# Patient Record
Sex: MALE | Race: WHITE | NOT HISPANIC OR LATINO | Employment: OTHER | URBAN - METROPOLITAN AREA
[De-identification: names, ages, dates, MRNs, and addresses within clinical notes are randomized per-mention and may not be internally consistent; named-entity substitution may affect disease eponyms.]

---

## 2017-01-17 ENCOUNTER — CLINICAL SUPPORT (OUTPATIENT)
Dept: SMOKING CESSATION | Facility: CLINIC | Age: 67
End: 2017-01-17
Payer: COMMERCIAL

## 2017-01-17 DIAGNOSIS — F17.210 MODERATE SMOKER (20 OR LESS PER DAY): Primary | ICD-10-CM

## 2017-01-17 PROCEDURE — 99404 PREV MED CNSL INDIV APPRX 60: CPT | Mod: S$GLB,,,

## 2017-01-17 RX ORDER — IBUPROFEN 200 MG
1 TABLET ORAL DAILY
Qty: 28 PATCH | Refills: 0 | Status: SHIPPED | OUTPATIENT
Start: 2017-01-17 | End: 2017-02-07 | Stop reason: SDUPTHER

## 2017-01-24 ENCOUNTER — CLINICAL SUPPORT (OUTPATIENT)
Dept: SMOKING CESSATION | Facility: CLINIC | Age: 67
End: 2017-01-24
Payer: COMMERCIAL

## 2017-01-24 DIAGNOSIS — F17.210 MODERATE SMOKER (20 OR LESS PER DAY): Primary | ICD-10-CM

## 2017-01-24 PROCEDURE — 90853 GROUP PSYCHOTHERAPY: CPT | Mod: S$GLB,,,

## 2017-01-24 NOTE — PROGRESS NOTES
Smoking Cessation Group Session #2    Site: [facility]  Date:  1/24/2017  Clinical Status of Patient: Outpatient   Length of Service and Code: 90 minutes - 71026   Number in Attendance: 12  Group Activities/Focus of Group: completion of TCRS (Tobacco Cessation Rating Scale) reviewed strategies, cues, and triggers. Introduced the negative impact of tobacco on health, the health advantages of discontinuing the use of tobacco, time line improved health changes after a quit, withdrawal issues to expect from nicotine and habit, and ways to achieve the goal of a quit.    Target symptoms:  withdrawal and medication side effects             The following were rated moderate (3) to severe (4) on TCRS:       Moderate 3: desire/crave tobacco, depressed mood, insomnia, restless, urinate more often     Severe 4:   Angry/irritable/frustrated, anxious/nervous, agitated/worked up  Patient's Response to Intervention: 22ppm; 121-15 cig/day; Patient remains on prescribed tobacco cessation medication regimen of 21 mg patch, without any negative side effects at this time.    Progress Toward Goals and Other Mental Status Changes: The patient denies any abnormal behavioral or mental changes at this time.     Diagnosis: Z72.0  Plan: The patient will continue with group therapy sessions and medication monitoring by CTTS. Prescribed medication management will be by physician.   Return to Clinic: 1 week    Quit Date:    Planned Quit Date:

## 2017-01-24 NOTE — Clinical Note
22ppm; 121-15 cig/day; Patient remains on prescribed tobacco cessation medication regimen of 21 mg patch, without any negative side effects at this time.

## 2017-01-31 ENCOUNTER — CLINICAL SUPPORT (OUTPATIENT)
Dept: SMOKING CESSATION | Facility: CLINIC | Age: 67
End: 2017-01-31
Payer: COMMERCIAL

## 2017-01-31 DIAGNOSIS — F17.200 NICOTINE DEPENDENCE: Primary | ICD-10-CM

## 2017-01-31 PROCEDURE — 90853 GROUP PSYCHOTHERAPY: CPT | Mod: S$GLB,,,

## 2017-01-31 NOTE — PROGRESS NOTES
Smoking Cessation Group Session #3    Site: [facility]  Date:  1/31/2017  Clinical Status of Patient: Outpatient   Length of Service and Code: 90 minutes - 77473   Number in Attendance: 11  Group Activities/Focus of Group:  completion of TCRS (Tobacco Cessation Rating Scale) reviewed strategies, controlling environment, cues, triggers, new goals set. Introduced high risk situations with preparation interventions, caffeine similarities with withdrawal issues of habit and nicotine, Alcohol, Understanding urges, cravings, stress and relaxation. Open discussion with intervention discussion.  Target symptoms:  withdrawal and medication side effects             The following were rated moderate (3) to severe (4) on TCRS:       Moderate 3: depressed mood, sad, difficulty concentrating, anxious, nervous, insomnia, agitated or worked up; reviewed with patient     Severe 4:   Angry, irritable, frustrated, restless; reviewed with patient  Patient's Response to Intervention:  Patient is down to smoking about 1ppd. Patient remains on prescribed tobacco cessation medication regimen of 21 mg patch, without any negative side effects at this time. Discussed rate reduction with him and encouraged him to wait 15 min prior to smoking, move location of where he keeps his cigarettes, and to stay distracted and develop new habits oppose to smoking. Pt's goal for the week is to cut back to remain tobacco free, and continue to wear the patch. CO was 17 ppm with last cigarette smoked 1 hr prior.. Will see pt back in 1 week for group session.    Progress Toward Goals and Other Mental Status Changes: The patient denies any abnormal behavioral or mental changes at this time.       Diagnosis: Z72.0  Plan: The patient will continue with group therapy sessions and medication regimen prescribed with management by physician or by the Cessation Clinic Provider. Patient will inform Smoking Cessation Counselor of symptoms as rated high on TCRS.    Return to  Clinic: 1 week

## 2017-01-31 NOTE — Clinical Note
Patient is down to smoking about 1ppd. Patient remains on prescribed tobacco cessation medication regimen of 21 mg patch, without any negative side effects at this time. Discussed rate reduction with him and encouraged him to wait 15 min prior to smoking, move location of where he keeps his cigarettes, and to stay distracted and develop new habits oppose to smoking. Pt's goal for the week is to cut back to remain tobacco free, and continue to wear the patch. CO was 17 ppm with last cigarette smoked 1 hr prior.. Will see pt back in 1 week for group session.

## 2017-02-07 ENCOUNTER — CLINICAL SUPPORT (OUTPATIENT)
Dept: SMOKING CESSATION | Facility: CLINIC | Age: 67
End: 2017-02-07
Payer: COMMERCIAL

## 2017-02-07 DIAGNOSIS — F17.210 MODERATE SMOKER (20 OR LESS PER DAY): Primary | ICD-10-CM

## 2017-02-07 PROCEDURE — 90853 GROUP PSYCHOTHERAPY: CPT | Mod: S$GLB,,,

## 2017-02-07 RX ORDER — IBUPROFEN 200 MG
1 TABLET ORAL DAILY
Qty: 14 PATCH | Refills: 0 | Status: SHIPPED | OUTPATIENT
Start: 2017-02-07 | End: 2017-02-09 | Stop reason: SDUPTHER

## 2017-02-07 NOTE — Clinical Note
13ppm; 15 cig/day; Patient remains on prescribed tobacco cessation medication regimen of 21 mg patch, without any negative side effects at this time.

## 2017-02-07 NOTE — PROGRESS NOTES
Smoking Cessation Group Session #4    Site: [facility]  Date:  2/7/2017  Clinical Status of Patient: Outpatient   Length of Service and Code: 90 minutes - 76353   Number in Attendance: 11  Group Activities/Focus of Group:  completion of TCRS (Tobacco Cessation Rating Scale) reviewed strategies, habitual behavior, stress, and high risk situations. Introduced stress with addition interventions, SOLVE, relaxation with interventions, nutrition, exercise, weight gain, and the importance of rewarding oneself for accomplishments toward becoming tobacco free. Open discussion of all items with interventions.     Target symptoms:  withdrawal and medication side effects             The following were rated moderate (3) to severe (4) on TCRS:       Moderate 3: none     Severe 4:   none  Patient's Response to Intervention: 13ppm; 15 cig/day; Patient remains on prescribed tobacco cessation medication regimen of 21 mg patch, without any negative side effects at this time.    Progress Toward Goals and Other Mental Status Changes: The patient denies any abnormal behavioral or mental changes at this time.     Diagnosis: Z72.0  Plan:The patient will continue with group therapy sessions and medication monitoring by CTTS. Prescribed medication management will be by physician.   Return to Clinic: 1 week    Quit Date:    Planned Quit Date:

## 2017-02-21 ENCOUNTER — CLINICAL SUPPORT (OUTPATIENT)
Dept: SMOKING CESSATION | Facility: CLINIC | Age: 67
End: 2017-02-21
Payer: COMMERCIAL

## 2017-02-21 DIAGNOSIS — F17.210 LIGHT CIGARETTE SMOKER (1-9 CIGS/DAY): Primary | ICD-10-CM

## 2017-02-21 PROCEDURE — 90853 GROUP PSYCHOTHERAPY: CPT | Mod: S$GLB,,,

## 2017-02-21 RX ORDER — IBUPROFEN 200 MG
1 TABLET ORAL DAILY
Qty: 14 PATCH | Refills: 0 | Status: SHIPPED | OUTPATIENT
Start: 2017-02-21 | End: 2018-04-12

## 2017-02-21 NOTE — Clinical Note
14ppm; 5 cig/day; The patient remains on the prescribed tobacco cessation medication regimen of 14 mg patch without any negative side effects at this time.

## 2017-02-21 NOTE — PROGRESS NOTES
Smoking Cessation Group Session #6    Site: [facility]  Date:  2/21/2017  Clinical Status of Patient: Outpatient   Length of Service and Code: 90 minutes - [id number]   Number in Attendance: 10  Group Activities/Focus of Group:  completion of TCRS (Tobacco Cessation Rating Scale) reviewed strategies, cues, triggers, high risk situations, lapses, relapses, diet, exercise, stress, relaxation, sleep, habitual behavior, and life style changes.    Target symptoms:  withdrawal and medication side effects             The following were rated moderate (3) to severe (4) on TCRS:       Moderate 3: none     Severe 4:   none  Patient's Response to Intervention: 14ppm; 5 cig/day; The patient remains on the prescribed tobacco cessation medication regimen of 14 mg patch without any negative side effects at this time.     Progress Toward Goals and Other Mental Status Changes: The patient denies any abnormal behavioral or mental changes at this time.     Diagnosis: Z72.0  Plan:The patient will continue with group therapy sessions and medication monitoring by CTTS. Prescribed medication management will be by physician.   Return to Clinic: 2 weeks    Quit Date:    Planned Quit Date:

## 2017-03-07 ENCOUNTER — CLINICAL SUPPORT (OUTPATIENT)
Dept: SMOKING CESSATION | Facility: CLINIC | Age: 67
End: 2017-03-07
Payer: COMMERCIAL

## 2017-03-07 DIAGNOSIS — F17.210 LIGHT CIGARETTE SMOKER (1-9 CIGS/DAY): Primary | ICD-10-CM

## 2017-03-07 PROCEDURE — 99401 PREV MED CNSL INDIV APPRX 15: CPT | Mod: S$GLB,,,

## 2017-03-07 NOTE — PROGRESS NOTES
Individual Follow-Up Form    3/7/2017    Quit Date:     Clinical Status of Patient: Outpatient    Length of Service: 15 minutes    Continuing Medication: yes  Patches    Other Medications:      Target Symptoms: Withdrawal and medication side effects. The following were  rated moderate (3) to severe (4) on TCRS:  · Moderate (3): none  · Severe (4): none    Comments: 16ppm; 4 cig/day; The patient remains on the prescribed tobacco cessation medication regimen of 14 mg patch without any negative side effects at this time.       Diagnosis: F17.210    Next Visit: 2 weeks

## 2017-03-07 NOTE — Clinical Note
16ppm; 4 cig/day; The patient remains on the prescribed tobacco cessation medication regimen of 14 mg patch without any negative side effects at this time.

## 2017-05-24 ENCOUNTER — CLINICAL SUPPORT (OUTPATIENT)
Dept: SMOKING CESSATION | Facility: CLINIC | Age: 67
End: 2017-05-24
Payer: COMMERCIAL

## 2017-05-24 DIAGNOSIS — F17.200 NICOTINE DEPENDENCE: Primary | ICD-10-CM

## 2017-05-24 PROCEDURE — 99407 BEHAV CHNG SMOKING > 10 MIN: CPT | Mod: S$GLB,,,

## 2018-01-16 ENCOUNTER — CLINICAL SUPPORT (OUTPATIENT)
Dept: SMOKING CESSATION | Facility: CLINIC | Age: 68
End: 2018-01-16
Payer: COMMERCIAL

## 2018-01-16 ENCOUNTER — TELEPHONE (OUTPATIENT)
Dept: SMOKING CESSATION | Facility: CLINIC | Age: 68
End: 2018-01-16

## 2018-01-16 DIAGNOSIS — F17.200 NICOTINE DEPENDENCE: Primary | ICD-10-CM

## 2018-01-16 PROCEDURE — 99406 BEHAV CHNG SMOKING 3-10 MIN: CPT | Mod: S$GLB,,,

## 2018-04-12 PROBLEM — E66.3 OVERWEIGHT (BMI 25.0-29.9): Status: ACTIVE | Noted: 2018-04-12

## 2019-07-26 ENCOUNTER — PATIENT OUTREACH (OUTPATIENT)
Dept: ADMINISTRATIVE | Facility: HOSPITAL | Age: 69
End: 2019-07-26

## 2019-07-26 ENCOUNTER — TELEPHONE (OUTPATIENT)
Dept: ADMINISTRATIVE | Facility: HOSPITAL | Age: 69
End: 2019-07-26

## 2020-08-17 PROBLEM — Z91.148 NON COMPLIANCE W MEDICATION REGIMEN: Status: ACTIVE | Noted: 2020-08-17

## 2021-02-17 PROBLEM — D12.6 TUBULAR ADENOMA OF COLON: Status: ACTIVE | Noted: 2021-02-17

## 2021-02-17 PROBLEM — Z91.148 NON COMPLIANCE W MEDICATION REGIMEN: Status: RESOLVED | Noted: 2020-08-17 | Resolved: 2021-02-17

## 2021-03-05 ENCOUNTER — IMMUNIZATION (OUTPATIENT)
Dept: FAMILY MEDICINE | Facility: CLINIC | Age: 71
End: 2021-03-05
Payer: MEDICARE

## 2021-03-05 DIAGNOSIS — Z23 NEED FOR VACCINATION: Primary | ICD-10-CM

## 2021-03-05 PROCEDURE — 91300 COVID-19, MRNA, LNP-S, PF, 30 MCG/0.3 ML DOSE VACCINE: CPT | Mod: PBBFAC | Performed by: FAMILY MEDICINE

## 2021-03-26 ENCOUNTER — IMMUNIZATION (OUTPATIENT)
Dept: FAMILY MEDICINE | Facility: CLINIC | Age: 71
End: 2021-03-26
Payer: MEDICARE

## 2021-03-26 DIAGNOSIS — Z23 NEED FOR VACCINATION: Primary | ICD-10-CM

## 2021-03-26 PROCEDURE — 91300 COVID-19, MRNA, LNP-S, PF, 30 MCG/0.3 ML DOSE VACCINE: CPT | Mod: PBBFAC | Performed by: FAMILY MEDICINE

## 2021-03-26 PROCEDURE — 0002A COVID-19, MRNA, LNP-S, PF, 30 MCG/0.3 ML DOSE VACCINE: CPT | Mod: PBBFAC | Performed by: FAMILY MEDICINE

## 2021-07-01 ENCOUNTER — HOSPITAL ENCOUNTER (EMERGENCY)
Facility: HOSPITAL | Age: 71
Discharge: HOME | End: 2021-07-01
Attending: EMERGENCY MEDICINE
Payer: MEDICARE

## 2021-07-01 ENCOUNTER — PATIENT MESSAGE (OUTPATIENT)
Dept: ADMINISTRATIVE | Facility: OTHER | Age: 71
End: 2021-07-01

## 2021-07-01 VITALS
HEART RATE: 60 BPM | BODY MASS INDEX: 26.12 KG/M2 | SYSTOLIC BLOOD PRESSURE: 122 MMHG | WEIGHT: 192.56 LBS | OXYGEN SATURATION: 100 % | DIASTOLIC BLOOD PRESSURE: 79 MMHG | RESPIRATION RATE: 18 BRPM | TEMPERATURE: 97 F

## 2021-07-01 DIAGNOSIS — S61.012A LACERATION OF LEFT THUMB WITH TENDON INVOLVEMENT, INITIAL ENCOUNTER: ICD-10-CM

## 2021-07-01 DIAGNOSIS — S61.022A LACERATION OF LEFT THUMB WITH FOREIGN BODY WITHOUT DAMAGE TO NAIL, INITIAL ENCOUNTER: Primary | ICD-10-CM

## 2021-07-01 PROCEDURE — 90471 IMMUNIZATION ADMIN: CPT | Performed by: NURSE PRACTITIONER

## 2021-07-01 PROCEDURE — 63600175 PHARM REV CODE 636 W HCPCS: Performed by: NURSE PRACTITIONER

## 2021-07-01 PROCEDURE — 99285 EMERGENCY DEPT VISIT HI MDM: CPT | Mod: 25

## 2021-07-01 PROCEDURE — 90715 TDAP VACCINE 7 YRS/> IM: CPT | Performed by: NURSE PRACTITIONER

## 2021-07-01 RX ADMIN — CLOSTRIDIUM TETANI TOXOID ANTIGEN (FORMALDEHYDE INACTIVATED), CORYNEBACTERIUM DIPHTHERIAE TOXOID ANTIGEN (FORMALDEHYDE INACTIVATED), BORDETELLA PERTUSSIS TOXOID ANTIGEN (GLUTARALDEHYDE INACTIVATED), BORDETELLA PERTUSSIS FILAMENTOUS HEMAGGLUTININ ANTIGEN (FORMALDEHYDE INACTIVATED), BORDETELLA PERTUSSIS PERTACTIN ANTIGEN, AND BORDETELLA PERTUSSIS FIMBRIAE 2/3 ANTIGEN 0.5 ML: 5; 2; 2.5; 5; 3; 5 INJECTION, SUSPENSION INTRAMUSCULAR at 12:07

## 2021-08-02 PROBLEM — E66.3 OVERWEIGHT (BMI 25.0-29.9): Status: ACTIVE | Noted: 2021-08-02

## 2022-04-25 ENCOUNTER — OFFICE VISIT (OUTPATIENT)
Dept: CARDIOLOGY | Facility: CLINIC | Age: 72
End: 2022-04-25
Payer: MEDICARE

## 2022-04-25 VITALS
DIASTOLIC BLOOD PRESSURE: 68 MMHG | SYSTOLIC BLOOD PRESSURE: 110 MMHG | BODY MASS INDEX: 23.89 KG/M2 | WEIGHT: 176.38 LBS | HEIGHT: 72 IN | OXYGEN SATURATION: 97 % | HEART RATE: 76 BPM

## 2022-04-25 DIAGNOSIS — I10 ESSENTIAL HYPERTENSION: ICD-10-CM

## 2022-04-25 DIAGNOSIS — I73.9 PAD (PERIPHERAL ARTERY DISEASE): ICD-10-CM

## 2022-04-25 DIAGNOSIS — I70.213 ATHEROSCLEROSIS OF NATIVE ARTERY OF BOTH LOWER EXTREMITIES WITH INTERMITTENT CLAUDICATION: ICD-10-CM

## 2022-04-25 DIAGNOSIS — F17.200 TOBACCO USE DISORDER: ICD-10-CM

## 2022-04-25 DIAGNOSIS — E78.2 MIXED HYPERLIPIDEMIA: ICD-10-CM

## 2022-04-25 DIAGNOSIS — I73.9 PERIPHERAL VASCULAR DISEASE, UNSPECIFIED: ICD-10-CM

## 2022-04-25 DIAGNOSIS — I73.9 CLAUDICATION IN PERIPHERAL VASCULAR DISEASE: Primary | ICD-10-CM

## 2022-04-25 PROCEDURE — 99999 PR PBB SHADOW E&M-EST. PATIENT-LVL III: ICD-10-PCS | Mod: PBBFAC,,,

## 2022-04-25 PROCEDURE — 1126F PR PAIN SEVERITY QUANTIFIED, NO PAIN PRESENT: ICD-10-PCS | Mod: CPTII,S$GLB,,

## 2022-04-25 PROCEDURE — 93000 EKG 12-LEAD: ICD-10-PCS | Mod: S$GLB,,, | Performed by: INTERNAL MEDICINE

## 2022-04-25 PROCEDURE — 1159F MED LIST DOCD IN RCRD: CPT | Mod: CPTII,S$GLB,,

## 2022-04-25 PROCEDURE — 99203 PR OFFICE/OUTPT VISIT, NEW, LEVL III, 30-44 MIN: ICD-10-PCS | Mod: S$GLB,,,

## 2022-04-25 PROCEDURE — 3078F PR MOST RECENT DIASTOLIC BLOOD PRESSURE < 80 MM HG: ICD-10-PCS | Mod: CPTII,S$GLB,,

## 2022-04-25 PROCEDURE — 3074F SYST BP LT 130 MM HG: CPT | Mod: CPTII,S$GLB,,

## 2022-04-25 PROCEDURE — 1159F PR MEDICATION LIST DOCUMENTED IN MEDICAL RECORD: ICD-10-PCS | Mod: CPTII,S$GLB,,

## 2022-04-25 PROCEDURE — 99203 OFFICE O/P NEW LOW 30 MIN: CPT | Mod: S$GLB,,,

## 2022-04-25 PROCEDURE — 1101F PR PT FALLS ASSESS DOC 0-1 FALLS W/OUT INJ PAST YR: ICD-10-PCS | Mod: CPTII,S$GLB,,

## 2022-04-25 PROCEDURE — 3008F BODY MASS INDEX DOCD: CPT | Mod: CPTII,S$GLB,,

## 2022-04-25 PROCEDURE — 3074F PR MOST RECENT SYSTOLIC BLOOD PRESSURE < 130 MM HG: ICD-10-PCS | Mod: CPTII,S$GLB,,

## 2022-04-25 PROCEDURE — 1160F RVW MEDS BY RX/DR IN RCRD: CPT | Mod: CPTII,S$GLB,,

## 2022-04-25 PROCEDURE — 99999 PR PBB SHADOW E&M-EST. PATIENT-LVL III: CPT | Mod: PBBFAC,,,

## 2022-04-25 PROCEDURE — 3008F PR BODY MASS INDEX (BMI) DOCUMENTED: ICD-10-PCS | Mod: CPTII,S$GLB,,

## 2022-04-25 PROCEDURE — 3288F FALL RISK ASSESSMENT DOCD: CPT | Mod: CPTII,S$GLB,,

## 2022-04-25 PROCEDURE — 1101F PT FALLS ASSESS-DOCD LE1/YR: CPT | Mod: CPTII,S$GLB,,

## 2022-04-25 PROCEDURE — 3078F DIAST BP <80 MM HG: CPT | Mod: CPTII,S$GLB,,

## 2022-04-25 PROCEDURE — 3288F PR FALLS RISK ASSESSMENT DOCUMENTED: ICD-10-PCS | Mod: CPTII,S$GLB,,

## 2022-04-25 PROCEDURE — 1160F PR REVIEW ALL MEDS BY PRESCRIBER/CLIN PHARMACIST DOCUMENTED: ICD-10-PCS | Mod: CPTII,S$GLB,,

## 2022-04-25 PROCEDURE — 1126F AMNT PAIN NOTED NONE PRSNT: CPT | Mod: CPTII,S$GLB,,

## 2022-04-25 PROCEDURE — 93000 ELECTROCARDIOGRAM COMPLETE: CPT | Mod: S$GLB,,, | Performed by: INTERNAL MEDICINE

## 2022-04-25 RX ORDER — CILOSTAZOL 50 MG/1
50 TABLET ORAL 2 TIMES DAILY
Qty: 60 TABLET | Refills: 11 | Status: SHIPPED | OUTPATIENT
Start: 2022-04-25 | End: 2023-04-25

## 2022-04-25 NOTE — ASSESSMENT & PLAN NOTE
- 1.5-2 ppd/ 57 yrs history   -Discussed smoking complications  -Discussed smoking cessation patient declines at present time, he has decreased the amount of cigarettes/ day

## 2022-04-25 NOTE — PROGRESS NOTES
Subjective:    Patient ID:  Beto Kearney Sr. is a 72 y.o. male who presents for evaluation of Peripheral Arterial Disease      PCP: YASH MARTIN MD     Referring Provider: Odin Cary MD     HPI: Patient is a 73 yo M w/PMH of HTN, HLD, Tobacco abuse (1.5- 2 ppd/ 57 yrs), ETOH ( 3 X wk, 6-8 beers) presents to clinic for initial evaluation of PVD. Patient reports intermittent claudication X 2-3 years but recently increased in intensity. Reports severe spasms associated with numbness and tingling denies changes in color. Patient denies CP, SOB, palpitations, pre-syncope, LOC, orthopnea. PND, or  Swelling. Patient reports intermittent medication compliance, takes ASA every other day because of the bruising. Patient report dietary non-compliance with low salt and low fat diet. Patient reports ambulating 50 yards prior to experiencing intermittent claudication  which resolves after a 10 min break. Patient monitors his BP at home averages 110-130s/50-60s.Patient does not exercise regularly but is very active at work, home, and dances.     Past Medical History:   Diagnosis Date    High cholesterol     Hypertension      Past Surgical History:   Procedure Laterality Date    COLONOSCOPY N/A 6/30/2016    Procedure: COLONOSCOPY;  Surgeon: Luis Bogran-Reyes, MD;  Location: [facility];  Service: Endoscopy;  Laterality: N/A;    SHOULDER SURGERY Right      Social History     Socioeconomic History    Marital status:     Years of education: ged   Tobacco Use    Smoking status: Current Every Day Smoker     Packs/day: 0.50     Years: 46.00     Pack years: 23.00     Types: Cigarettes    Smokeless tobacco: Never Used   Substance and Sexual Activity    Alcohol use: Yes     Alcohol/week: 0.0 standard drinks    Drug use: No    Sexual activity: Never     Family History   Problem Relation Age of Onset    Diabetes Mother     Hypertension Mother     Cancer Father     Lung cancer Father        Review of  patient's allergies indicates:   Allergen Reactions    Suture, silk Hives       Medication List with Changes/Refills   New Medications    CILOSTAZOL (PLETAL) 50 MG TAB    Take 1 tablet (50 mg total) by mouth 2 (two) times daily.   Current Medications    ALBUTEROL (PROAIR HFA) 90 MCG/ACTUATION INHALER    Inhale 2 puffs into the lungs every 6 (six) hours as needed for Wheezing or Shortness of Breath. Rescue    ASPIRIN (ECOTRIN) 81 MG EC TABLET    Take 81 mg by mouth once daily.    ATORVASTATIN (LIPITOR) 40 MG TABLET    Take 1 tablet (40 mg total) by mouth every evening.    LISINOPRIL (PRINIVIL,ZESTRIL) 20 MG TABLET    Take 1 tablet (20 mg total) by mouth every evening.    SILDENAFIL (VIAGRA) 100 MG TABLET    Take 1 tablet (100 mg total) by mouth daily as needed for Erectile Dysfunction.       Review of Systems   Constitutional: Negative for diaphoresis and fever.   HENT: Negative for congestion and hearing loss.    Eyes: Negative for blurred vision and pain.   Cardiovascular: Positive for claudication. Negative for chest pain, dyspnea on exertion, leg swelling, near-syncope, palpitations and syncope.   Respiratory: Negative for shortness of breath and sleep disturbances due to breathing.    Hematologic/Lymphatic: Negative for bleeding problem. Does not bruise/bleed easily.   Skin: Negative for color change and poor wound healing.   Gastrointestinal: Negative for abdominal pain and nausea.   Genitourinary: Negative for bladder incontinence and flank pain.   Neurological: Negative for focal weakness and light-headedness.        Objective:   /68 (BP Location: Left arm, Patient Position: Sitting, BP Method: Large (Manual))   Pulse 76   Ht 6' (1.829 m)   Wt 80 kg (176 lb 5.9 oz)   SpO2 97%   BMI 23.92 kg/m²    Physical Exam  Constitutional:       Appearance: He is well-developed. He is not diaphoretic.   HENT:      Head: Normocephalic and atraumatic.   Eyes:      General: No scleral icterus.     Pupils: Pupils  are equal, round, and reactive to light.   Neck:      Vascular: No JVD.   Cardiovascular:      Rate and Rhythm: Normal rate and regular rhythm.      Pulses: Intact distal pulses.           Radial pulses are 2+ on the right side and 2+ on the left side.        Dorsalis pedis pulses are detected w/ Doppler on the right side and detected w/ Doppler on the left side.        Posterior tibial pulses are detected w/ Doppler on the right side and detected w/ Doppler on the left side.      Heart sounds: S1 normal and S2 normal. No murmur heard.    No friction rub. No gallop.      Comments: Doppler- Monophasic AUNDREA DP & PT   Pulmonary:      Effort: Pulmonary effort is normal. No respiratory distress.      Breath sounds: Normal breath sounds. No wheezing or rales.   Chest:      Chest wall: No tenderness.   Abdominal:      General: Bowel sounds are normal. There is no distension.      Palpations: Abdomen is soft. There is no mass.      Tenderness: There is no abdominal tenderness. There is no rebound.   Musculoskeletal:         General: No tenderness. Normal range of motion.      Cervical back: Normal range of motion and neck supple.   Skin:     General: Skin is warm and dry.      Coloration: Skin is not pale.   Neurological:      Mental Status: He is alert and oriented to person, place, and time.      Coordination: Coordination normal.      Deep Tendon Reflexes: Reflexes normal.   Psychiatric:         Behavior: Behavior normal.         Judgment: Judgment normal.           Assessment:       1. Claudication in peripheral vascular disease    2. Peripheral vascular disease, unspecified    3. PAD (peripheral artery disease)    4. Essential hypertension    5. Mixed hyperlipidemia    6. Tobacco use disorder    7. Atherosclerosis of native artery of both lower extremities with intermittent claudication         Plan:         PAD (peripheral artery disease)  -BLE arterial US 4/13/22  FINDINGS:  Duplex and color flow Doppler evaluation  demonstrates patent right common femoral, deep femoral, popliteal, posterior tibial, anterior tibial, and dorsal pedis artery.  The right superficial femoral artery is occluded.  There are doubling velocities within the right common femoral and deep femoral arteries consistent with hemodynamically significant stenoses.     The left common femoral artery and deep femoral arteries are patent.  There is a more than doubling of velocity within deep femoral artery consistent with a hemodynamically significant stenosis.  The left superficial femoral artery is occluded.  The left popliteal, posterior tibial, anterior tibial, and dorsal pedis arteries are patent.     Impression:     Multifocal areas of hemodynamically significant stenoses with occluded superficial femoral arteries bilaterally.    -Continue statin therapy  -Continue ASA  -Continue HTN management- controlled in office   -Start cilostazol  50 mg BID  -WYATT study   -PAD rehab   -EKG        Essential hypertension  Goal BP < 140/80  -Controlled 110/68  -Continue medication regimen: Lisinopril 20 mg     Mixed hyperlipidemia  -Controlled  -.2  -Continue statin therapy: atorvastatin 40 mg     Tobacco use disorder  - 1.5-2 ppd/ 57 yrs history   -Discussed smoking complications  -Discussed smoking cessation patient declines at present time, he has decreased the amount of cigarettes/ day      Atherosclerosis of native artery of both lower extremities with intermittent claudication  -BLE arterial US 4/13/22  FINDINGS:  Duplex and color flow Doppler evaluation demonstrates patent right common femoral, deep femoral, popliteal, posterior tibial, anterior tibial, and dorsal pedis artery.  The right superficial femoral artery is occluded.  There are doubling velocities within the right common femoral and deep femoral arteries consistent with hemodynamically significant stenoses.     The left common femoral artery and deep femoral arteries are patent.  There is a more  than doubling of velocity within deep femoral artery consistent with a hemodynamically significant stenosis.  The left superficial femoral artery is occluded.  The left popliteal, posterior tibial, anterior tibial, and dorsal pedis arteries are patent.     Impression:     Multifocal areas of hemodynamically significant stenoses with occluded superficial femoral arteries bilaterally.    -Continue statin therapy  -Continue ASA  -Continue HTN management- controlled in office   -Start cilostazol  50 mg BID  -WYATT study   -PAD rehab   -EKG          Total duration of face to face visit time  30 minutes.  Total time spent counseling greater than fifty percent of total visit time.  Counseling included discussion regarding imaging findings, diagnosis, possibilities, treatment options, risks and benefits.  The patient had many questions regarding the options and long-term effects      Rashaad Mancera NP  Cardiology

## 2022-04-25 NOTE — ASSESSMENT & PLAN NOTE
-BLE arterial US 4/13/22  FINDINGS:  Duplex and color flow Doppler evaluation demonstrates patent right common femoral, deep femoral, popliteal, posterior tibial, anterior tibial, and dorsal pedis artery.  The right superficial femoral artery is occluded.  There are doubling velocities within the right common femoral and deep femoral arteries consistent with hemodynamically significant stenoses.     The left common femoral artery and deep femoral arteries are patent.  There is a more than doubling of velocity within deep femoral artery consistent with a hemodynamically significant stenosis.  The left superficial femoral artery is occluded.  The left popliteal, posterior tibial, anterior tibial, and dorsal pedis arteries are patent.     Impression:     Multifocal areas of hemodynamically significant stenoses with occluded superficial femoral arteries bilaterally.    -Continue statin therapy  -Continue ASA  -Continue HTN management- controlled in office   -Start cilostazol  50 mg BID  -WYATT study   -PAD rehab   -EKG

## 2022-07-25 ENCOUNTER — TELEPHONE (OUTPATIENT)
Dept: CARDIOLOGY | Facility: CLINIC | Age: 72
End: 2022-07-25
Payer: MEDICARE

## 2022-09-26 ENCOUNTER — TELEPHONE (OUTPATIENT)
Dept: CARDIOLOGY | Facility: CLINIC | Age: 72
End: 2022-09-26
Payer: MEDICARE

## 2022-09-26 NOTE — TELEPHONE ENCOUNTER
Spoke with pt's daughter regarding test results and scheduled a follow up for next week and pt's daughter expressed understanding

## 2022-09-26 NOTE — TELEPHONE ENCOUNTER
----- Message from Rashaad Mancera NP sent at 9/26/2022  3:51 PM CDT -----  Please inform Mr. Kearney that the WYATT study was positive for PAD. Continue taking the medications as prescribed and schedule a follow up appointment.   Thanks, Rashaad

## 2022-10-14 ENCOUNTER — OFFICE VISIT (OUTPATIENT)
Dept: CARDIOLOGY | Facility: CLINIC | Age: 72
End: 2022-10-14
Payer: MEDICARE

## 2022-10-14 VITALS
DIASTOLIC BLOOD PRESSURE: 78 MMHG | BODY MASS INDEX: 23.43 KG/M2 | HEART RATE: 72 BPM | HEIGHT: 72 IN | WEIGHT: 173 LBS | SYSTOLIC BLOOD PRESSURE: 140 MMHG | OXYGEN SATURATION: 100 %

## 2022-10-14 DIAGNOSIS — I10 ESSENTIAL HYPERTENSION: ICD-10-CM

## 2022-10-14 DIAGNOSIS — E78.2 MIXED HYPERLIPIDEMIA: ICD-10-CM

## 2022-10-14 DIAGNOSIS — I70.213 ATHEROSCLEROSIS OF NATIVE ARTERY OF BOTH LOWER EXTREMITIES WITH INTERMITTENT CLAUDICATION: ICD-10-CM

## 2022-10-14 DIAGNOSIS — I73.9 PAD (PERIPHERAL ARTERY DISEASE): ICD-10-CM

## 2022-10-14 PROCEDURE — 1126F PR PAIN SEVERITY QUANTIFIED, NO PAIN PRESENT: ICD-10-PCS | Mod: CPTII,S$GLB,,

## 2022-10-14 PROCEDURE — 99999 PR PBB SHADOW E&M-EST. PATIENT-LVL III: ICD-10-PCS | Mod: PBBFAC,,,

## 2022-10-14 PROCEDURE — 99214 PR OFFICE/OUTPT VISIT, EST, LEVL IV, 30-39 MIN: ICD-10-PCS | Mod: S$GLB,,,

## 2022-10-14 PROCEDURE — 1101F PR PT FALLS ASSESS DOC 0-1 FALLS W/OUT INJ PAST YR: ICD-10-PCS | Mod: CPTII,S$GLB,,

## 2022-10-14 PROCEDURE — 1159F PR MEDICATION LIST DOCUMENTED IN MEDICAL RECORD: ICD-10-PCS | Mod: CPTII,S$GLB,,

## 2022-10-14 PROCEDURE — 3288F PR FALLS RISK ASSESSMENT DOCUMENTED: ICD-10-PCS | Mod: CPTII,S$GLB,,

## 2022-10-14 PROCEDURE — 3078F PR MOST RECENT DIASTOLIC BLOOD PRESSURE < 80 MM HG: ICD-10-PCS | Mod: CPTII,S$GLB,,

## 2022-10-14 PROCEDURE — 99214 OFFICE O/P EST MOD 30 MIN: CPT | Mod: S$GLB,,,

## 2022-10-14 PROCEDURE — 3077F PR MOST RECENT SYSTOLIC BLOOD PRESSURE >= 140 MM HG: ICD-10-PCS | Mod: CPTII,S$GLB,,

## 2022-10-14 PROCEDURE — 3077F SYST BP >= 140 MM HG: CPT | Mod: CPTII,S$GLB,,

## 2022-10-14 PROCEDURE — 1160F PR REVIEW ALL MEDS BY PRESCRIBER/CLIN PHARMACIST DOCUMENTED: ICD-10-PCS | Mod: CPTII,S$GLB,,

## 2022-10-14 PROCEDURE — 3078F DIAST BP <80 MM HG: CPT | Mod: CPTII,S$GLB,,

## 2022-10-14 PROCEDURE — 1160F RVW MEDS BY RX/DR IN RCRD: CPT | Mod: CPTII,S$GLB,,

## 2022-10-14 PROCEDURE — 1126F AMNT PAIN NOTED NONE PRSNT: CPT | Mod: CPTII,S$GLB,,

## 2022-10-14 PROCEDURE — 99999 PR PBB SHADOW E&M-EST. PATIENT-LVL III: CPT | Mod: PBBFAC,,,

## 2022-10-14 PROCEDURE — 1101F PT FALLS ASSESS-DOCD LE1/YR: CPT | Mod: CPTII,S$GLB,,

## 2022-10-14 PROCEDURE — 3288F FALL RISK ASSESSMENT DOCD: CPT | Mod: CPTII,S$GLB,,

## 2022-10-14 PROCEDURE — 1159F MED LIST DOCD IN RCRD: CPT | Mod: CPTII,S$GLB,,

## 2022-10-14 NOTE — ASSESSMENT & PLAN NOTE
-BLE arterial US 4/13/22   Impression:     Multifocal areas of hemodynamically significant stenoses with occluded superficial femoral arteries bilaterally.    -Continue statin therapy  -Continue ASA  -Continue HTN management- controlled in office   -Start cilostazol  50 mg BID  -WYATT study:  ild to moderate PAD of bilateral lower extremities with reduced TBIs  -PAD rehab patient declines at present time

## 2022-10-14 NOTE — PROGRESS NOTES
Subjective:    Patient ID:  Beto Kearney Sr. is a 72 y.o. male who presents for evaluation of Follow-up      PCP: YASH MARTIN MD     Referring Provider: Odin Cary MD     HPI: Patient is a 73 yo M w/PMH of HTN, HLD, Tobacco abuse (1.5- 2 ppd/ 57 yrs), ETOH ( 3 X wk, 6-8 beers) presents today for f/u appt. He was last seen on 4/25/22 for initial evaluation of PVD. Patient reports intermittent claudication X 2-3 years but recently increased in intensity. Reports severe spasms associated with numbness and tingling denies changes in color. Patient denies CP, SOB, palpitations, pre-syncope, LOC, orthopnea. PND, or  Swelling. Patient reports intermittent medication compliance, takes ASA every other day because of the bruising. Patient report dietary non-compliance with low salt and low fat diet. Patient reports ambulating 50 yards prior to experiencing intermittent claudication  which resolves after a 10 min break. Patient monitors his BP at home averages 110-130s/50-60s.Patient does not exercise regularly but is very active at work, home, and dances.       Patient notes since starting cilostazol pain has decreased. He also notes increasing his activity without limitations . Patient home BP runs 120-130s/70-80s, he monitors daily. He denies any symptoms.     Past Medical History:   Diagnosis Date    High cholesterol     Hypertension      Past Surgical History:   Procedure Laterality Date    COLONOSCOPY N/A 6/30/2016    Procedure: COLONOSCOPY;  Surgeon: Luis Bogran-Reyes, MD;  Location: [facility];  Service: Endoscopy;  Laterality: N/A;    SHOULDER SURGERY Right      Social History     Socioeconomic History    Marital status:     Years of education: ged   Tobacco Use    Smoking status: Every Day     Packs/day: 0.50     Years: 46.00     Pack years: 23.00     Types: Cigarettes    Smokeless tobacco: Never   Substance and Sexual Activity    Alcohol use: Yes     Alcohol/week: 0.0 standard drinks     Drug use: No    Sexual activity: Never     Family History   Problem Relation Age of Onset    Diabetes Mother     Hypertension Mother     Cancer Father     Lung cancer Father        Review of patient's allergies indicates:   Allergen Reactions    Concetta, silk Hives       Medication List with Changes/Refills   Current Medications    ALBUTEROL (PROAIR HFA) 90 MCG/ACTUATION INHALER    Inhale 2 puffs into the lungs every 6 (six) hours as needed for Wheezing or Shortness of Breath. Rescue    ASPIRIN (ECOTRIN) 81 MG EC TABLET    Take 81 mg by mouth once daily.    ATORVASTATIN (LIPITOR) 40 MG TABLET    Take 1 tablet (40 mg total) by mouth every evening.    CILOSTAZOL (PLETAL) 50 MG TAB    Take 1 tablet (50 mg total) by mouth 2 (two) times daily.    LISINOPRIL (PRINIVIL,ZESTRIL) 20 MG TABLET    Take 1 tablet (20 mg total) by mouth every evening.    SILDENAFIL (VIAGRA) 100 MG TABLET    Take 1 tablet (100 mg total) by mouth daily as needed for Erectile Dysfunction.       Review of Systems   Constitutional: Negative for diaphoresis and fever.   HENT:  Negative for congestion and hearing loss.    Eyes:  Negative for blurred vision and pain.   Cardiovascular:  Positive for claudication. Negative for chest pain, dyspnea on exertion, leg swelling, near-syncope, palpitations and syncope.   Respiratory:  Negative for shortness of breath and sleep disturbances due to breathing.    Hematologic/Lymphatic: Negative for bleeding problem. Does not bruise/bleed easily.   Skin:  Negative for color change and poor wound healing.   Gastrointestinal:  Negative for abdominal pain and nausea.   Genitourinary:  Negative for bladder incontinence and flank pain.   Neurological:  Negative for focal weakness and light-headedness.      Objective:   BP (!) 140/78   Pulse 72   Ht 6' (1.829 m)   Wt 78.5 kg (173 lb)   SpO2 100%   BMI 23.46 kg/m²    Physical Exam  Constitutional:       Appearance: He is well-developed. He is not diaphoretic.   HENT:       Head: Normocephalic and atraumatic.   Eyes:      General: No scleral icterus.     Pupils: Pupils are equal, round, and reactive to light.   Neck:      Vascular: No JVD.   Cardiovascular:      Rate and Rhythm: Normal rate and regular rhythm.      Pulses: Intact distal pulses.           Radial pulses are 2+ on the right side and 2+ on the left side.        Dorsalis pedis pulses are detected w/ Doppler on the right side and detected w/ Doppler on the left side.        Posterior tibial pulses are detected w/ Doppler on the right side and detected w/ Doppler on the left side.      Heart sounds: S1 normal and S2 normal. No murmur heard.    No friction rub. No gallop.      Comments: Doppler- Monophasic AUNDREA DP & PT   Pulmonary:      Effort: Pulmonary effort is normal. No respiratory distress.      Breath sounds: Normal breath sounds. No wheezing or rales.   Chest:      Chest wall: No tenderness.   Abdominal:      General: Bowel sounds are normal. There is no distension.      Palpations: Abdomen is soft. There is no mass.      Tenderness: There is no abdominal tenderness. There is no rebound.   Musculoskeletal:         General: No tenderness. Normal range of motion.      Cervical back: Normal range of motion and neck supple.   Skin:     General: Skin is warm and dry.      Coloration: Skin is not pale.   Neurological:      Mental Status: He is alert and oriented to person, place, and time.      Coordination: Coordination normal.      Deep Tendon Reflexes: Reflexes normal.   Psychiatric:         Behavior: Behavior normal.         Judgment: Judgment normal.     WYATT study 9/23/22  Conclusion    Mild to moderate PAD of bilateral lower extremities with reduced TBIs (R WYATT 0.74, L WYATT 0.63).    Findings    WYATT The right resting WYATT reduction is decreased.   The right arm artery has triphasic flow.  The right ankle [DT] artery has monophasic flow.   The right ankle [DP] artery has monophasic flow.   The left resting WYATT reduction  is moderate decreased.   The left arm artery has triphasic flow.  The left ankle [PT] artery has monophasic flow.  The left ankle [DP] artery has monophasic flow.         BLE arterial US 4/13/22  FINDINGS:  Duplex and color flow Doppler evaluation demonstrates patent right common femoral, deep femoral, popliteal, posterior tibial, anterior tibial, and dorsal pedis artery.  The right superficial femoral artery is occluded.  There are doubling velocities within the right common femoral and deep femoral arteries consistent with hemodynamically significant stenoses.     The left common femoral artery and deep femoral arteries are patent.  There is a more than doubling of velocity within deep femoral artery consistent with a hemodynamically significant stenosis.  The left superficial femoral artery is occluded.  The left popliteal, posterior tibial, anterior tibial, and dorsal pedis arteries are patent.     Impression:   Multifocal areas of hemodynamically significant stenoses with occluded superficial femoral arteries bilaterally    Assessment:       1. Atherosclerosis of native artery of both lower extremities with intermittent claudication    2. Essential hypertension    3. Mixed hyperlipidemia    4. PAD (peripheral artery disease)           Plan:         Atherosclerosis of native artery of both lower extremities with intermittent claudication  -BLE arterial US 4/13/22   Impression:     Multifocal areas of hemodynamically significant stenoses with occluded superficial femoral arteries bilaterally.    -Continue statin therapy  -Continue ASA  -Continue HTN management- controlled in office   -Start cilostazol  50 mg BID  -WYATT study:  ild to moderate PAD of bilateral lower extremities with reduced TBIs  -PAD rehab patient declines at present time         Essential hypertension  Goal BP < 140/80  -Controlled 140/78  -Continue medication regimen: Lisinopril 20 mg     Mixed hyperlipidemia  -Controlled  -.2  -Continue  statin therapy: atorvastatin 40 mg     PAD (peripheral artery disease)  -BLE arterial US 4/13/22  Impression:     Multifocal areas of hemodynamically significant stenoses with occluded superficial femoral arteries bilaterally.    -Continue statin therapy  -Continue ASA  -Continue HTN management- controlled in office   -Start cilostazol  50 mg BID  -WYATT study:  Mild to moderate PAD of bilateral lower extremities with reduced TBIs (R WYATT 0.74, L WYATT 0.63).    Findings  -PAD rehab declines at present time            Total duration of face to face visit time  30 minutes.  Total time spent counseling greater than fifty percent of total visit time.  Counseling included discussion regarding imaging findings, diagnosis, possibilities, treatment options, risks and benefits.  The patient had many questions regarding the options and long-term effects      Rashaad Mancera NP  Cardiology

## 2023-10-26 NOTE — ASSESSMENT & PLAN NOTE
-BLE arterial US 4/13/22  Impression:     Multifocal areas of hemodynamically significant stenoses with occluded superficial femoral arteries bilaterally.    -Continue statin therapy  -Continue ASA  -Continue HTN management- controlled in office   -Start cilostazol  50 mg BID   -WYATT study:  Mild to moderate PAD of bilateral lower extremities with reduced TBIs (R WYATT 0.74, L WYATT 0.63).    Findings  -PAD rehab declines at present time         Acute UTI

## 2024-04-08 DIAGNOSIS — I70.213 ATHEROSCLEROSIS OF NATIVE ARTERY OF BOTH LOWER EXTREMITIES WITH INTERMITTENT CLAUDICATION: Primary | ICD-10-CM

## 2024-04-08 DIAGNOSIS — I71.40 AAA (ABDOMINAL AORTIC ANEURYSM): ICD-10-CM
